# Patient Record
Sex: FEMALE | URBAN - METROPOLITAN AREA
[De-identification: names, ages, dates, MRNs, and addresses within clinical notes are randomized per-mention and may not be internally consistent; named-entity substitution may affect disease eponyms.]

---

## 2021-11-17 ENCOUNTER — APPOINTMENT (RX ONLY)
Dept: URBAN - METROPOLITAN AREA CLINIC 120 | Facility: CLINIC | Age: 61
Setting detail: DERMATOLOGY
End: 2021-11-17

## 2021-11-17 DIAGNOSIS — L259 CONTACT DERMATITIS AND OTHER ECZEMA, UNSPECIFIED CAUSE: ICD-10-CM

## 2021-11-17 DIAGNOSIS — R21 RASH AND OTHER NONSPECIFIC SKIN ERUPTION: ICD-10-CM

## 2021-11-17 PROBLEM — L23.9 ALLERGIC CONTACT DERMATITIS, UNSPECIFIED CAUSE: Status: ACTIVE | Noted: 2021-11-17

## 2021-11-17 PROCEDURE — 99203 OFFICE O/P NEW LOW 30 MIN: CPT

## 2021-11-17 PROCEDURE — ? TREATMENT REGIMEN

## 2021-11-17 PROCEDURE — ? PRESCRIPTION

## 2021-11-17 RX ORDER — TACROLIMUS 1 MG/G
OINTMENT TOPICAL
Qty: 60 | Refills: 3 | Status: ERX | COMMUNITY
Start: 2021-11-17

## 2021-11-17 RX ORDER — CLOTRIMAZOLE 1 G/100G
CREAM TOPICAL
Qty: 45 | Refills: 3 | Status: ERX | COMMUNITY
Start: 2021-11-17

## 2021-11-17 RX ORDER — HYDROCORTISONE 25 MG/G
CREAM TOPICAL
Qty: 30 | Refills: 3 | Status: ERX | COMMUNITY
Start: 2021-11-17

## 2021-11-17 RX ADMIN — TACROLIMUS: 1 OINTMENT TOPICAL at 00:00

## 2021-11-17 RX ADMIN — CLOTRIMAZOLE: 1 CREAM TOPICAL at 00:00

## 2021-11-17 RX ADMIN — HYDROCORTISONE: 25 CREAM TOPICAL at 00:00

## 2021-11-17 ASSESSMENT — LOCATION DETAILED DESCRIPTION DERM
LOCATION DETAILED: RIGHT SUPERIOR MEDIAL MALAR CHEEK
LOCATION DETAILED: LEFT SUPERIOR CENTRAL MALAR CHEEK
LOCATION DETAILED: RIGHT CENTRAL BUCCAL CHEEK
LOCATION DETAILED: LEFT CENTRAL BUCCAL CHEEK

## 2021-11-17 ASSESSMENT — LOCATION SIMPLE DESCRIPTION DERM
LOCATION SIMPLE: LEFT CHEEK
LOCATION SIMPLE: RIGHT CHEEK

## 2021-11-17 ASSESSMENT — LOCATION ZONE DERM: LOCATION ZONE: FACE

## 2021-11-17 NOTE — HPI: RASH
How Severe Is Your Rash?: mild
Is This A New Presentation, Or A Follow-Up?: Rash
Additional History: Pt says her ophthalmologist doesn’t want her to use steroids on her face because she had a build up of fluid behind her eyes. She got chemotherapy shots from him. When she stopped the mometasone is when the rash came back

## 2021-11-17 NOTE — PROCEDURE: TREATMENT REGIMEN
Initiate Treatment: Tacrolimus 0.1% qd around the eyes
Detail Level: Zone
Initiate Treatment: Mix hydrocortisone 2.5% and clotrimazole qd around the lips
Otc Regimen: Very gentle face washes